# Patient Record
Sex: MALE | Race: WHITE | NOT HISPANIC OR LATINO | Employment: OTHER | URBAN - METROPOLITAN AREA
[De-identification: names, ages, dates, MRNs, and addresses within clinical notes are randomized per-mention and may not be internally consistent; named-entity substitution may affect disease eponyms.]

---

## 2017-05-04 ENCOUNTER — HOSPITAL ENCOUNTER (OUTPATIENT)
Dept: ORTHOPEDIC SURGERY | Facility: CLINIC | Age: 62
Discharge: HOME | End: 2017-05-04
Attending: ORTHOPAEDIC SURGERY | Admitting: ORTHOPAEDIC SURGERY

## 2018-06-23 ENCOUNTER — HOSPITAL ENCOUNTER (OUTPATIENT)
Dept: OTHER | Facility: HOSPITAL | Age: 63
Discharge: HOME | End: 2018-06-23
Attending: ORTHOPAEDIC SURGERY | Admitting: ORTHOPAEDIC SURGERY

## 2018-06-23 LAB
ANION GAP SERPL CALC-SCNC: 11.3 MMOL/L (ref 10–20)
APTT BLD: 22.7 SEC (ref 24–31)
BACTERIA SPEC AEROBE CULT: NORMAL
BASOPHILS # BLD AUTO: 0.1 10*3/UL (ref 0–0.2)
BASOPHILS NFR BLD AUTO: 1 % (ref 0–2)
BUN SERPL-MCNC: 14 MG/DL (ref 8–20)
BUN/CREAT SERPL: 15.6 (ref 6.2–20.3)
CALCIUM SERPL-MCNC: 9.8 MG/DL (ref 8.9–10.3)
CHLORIDE SERPL-SCNC: 108 MMOL/L (ref 101–111)
CONV CO2: 25 MMOL/L (ref 22–32)
CREAT UR-MCNC: 0.9 MG/DL (ref 0.7–1.2)
DIFFERENTIAL METHOD BLD: (no result)
EOSINOPHIL # BLD AUTO: 0.2 10*3/UL (ref 0–0.3)
EOSINOPHIL # BLD AUTO: 3 % (ref 0–3)
ERYTHROCYTE [DISTWIDTH] IN BLOOD BY AUTOMATED COUNT: 14 % (ref 11.5–14.5)
GLUCOSE SERPL-MCNC: 136 MG/DL (ref 65–99)
HCT VFR BLD AUTO: 43.5 % (ref 40–54)
HGB BLD-MCNC: 14.7 G/DL (ref 14–18)
INR PPP: 1
LYMPHOCYTES # BLD AUTO: 2 10*3/UL (ref 0.8–4.8)
LYMPHOCYTES NFR BLD AUTO: 31 % (ref 18–42)
Lab: NORMAL
MCH RBC QN AUTO: 31.9 PG (ref 26–32)
MCHC RBC AUTO-ENTMCNC: 33.9 G/DL (ref 32–36)
MCV RBC AUTO: 94.1 FL (ref 80–94)
MICRO REPORT STATUS: NORMAL
MONOCYTES # BLD AUTO: 0.8 10*3/UL (ref 0.1–1.3)
MONOCYTES NFR BLD AUTO: 12 % (ref 2–11)
NEUTROPHILS # BLD AUTO: 3.5 10*3/UL (ref 2.3–8.6)
NEUTROPHILS NFR BLD AUTO: 53 % (ref 50–75)
NRBC BLD AUTO-RTO: 0 /100{WBCS}
NRBC/RBC NFR BLD MANUAL: 0 10*3/UL
PLATELET # BLD AUTO: 184 10*3/UL (ref 150–450)
PMV BLD AUTO: 8.2 FL (ref 7.4–10.4)
POTASSIUM SERPL-SCNC: 4.3 MMOL/L (ref 3.6–5.1)
PROTHROMBIN TIME: 10.4 SEC (ref 9.6–11.7)
RBC # BLD AUTO: 4.62 10*6/UL (ref 4.6–6)
SODIUM SERPL-SCNC: 140 MMOL/L (ref 136–144)
SPECIMEN SOURCE: NORMAL
WBC # BLD AUTO: 6.6 10*3/UL (ref 4.5–11.5)

## 2018-06-26 ENCOUNTER — HOSPITAL ENCOUNTER (OUTPATIENT)
Dept: PREOP | Facility: HOSPITAL | Age: 63
Setting detail: HOSPITAL OUTPATIENT SURGERY
Discharge: HOME | End: 2018-06-26
Attending: ORTHOPAEDIC SURGERY | Admitting: ORTHOPAEDIC SURGERY

## 2018-06-26 LAB — GLUCOSE BLD-MCNC: 127 MG/DL (ref 70–105)

## 2021-03-22 ENCOUNTER — BULK ORDERING (OUTPATIENT)
Dept: CASE MANAGEMENT | Facility: OTHER | Age: 66
End: 2021-03-22

## 2021-03-22 DIAGNOSIS — Z23 IMMUNIZATION DUE: ICD-10-CM

## 2022-03-03 ENCOUNTER — ANESTHESIA EVENT (OUTPATIENT)
Dept: PERIOP | Facility: HOSPITAL | Age: 67
End: 2022-03-03

## 2022-03-03 ENCOUNTER — PRE-ADMISSION TESTING (OUTPATIENT)
Dept: PREADMISSION TESTING | Facility: HOSPITAL | Age: 67
End: 2022-03-03

## 2022-03-03 VITALS
SYSTOLIC BLOOD PRESSURE: 121 MMHG | RESPIRATION RATE: 20 BRPM | DIASTOLIC BLOOD PRESSURE: 67 MMHG | TEMPERATURE: 98.6 F | OXYGEN SATURATION: 96 % | WEIGHT: 302 LBS | HEIGHT: 71 IN | BODY MASS INDEX: 42.28 KG/M2 | HEART RATE: 96 BPM

## 2022-03-03 LAB
ANION GAP SERPL CALCULATED.3IONS-SCNC: 12.2 MMOL/L (ref 5–15)
BUN SERPL-MCNC: 16 MG/DL (ref 8–23)
BUN/CREAT SERPL: 13.1 (ref 7–25)
CALCIUM SPEC-SCNC: 8.9 MG/DL (ref 8.6–10.5)
CHLORIDE SERPL-SCNC: 106 MMOL/L (ref 98–107)
CO2 SERPL-SCNC: 22.8 MMOL/L (ref 22–29)
CREAT SERPL-MCNC: 1.22 MG/DL (ref 0.76–1.27)
DEPRECATED RDW RBC AUTO: 45.9 FL (ref 37–54)
EGFRCR SERPLBLD CKD-EPI 2021: 65 ML/MIN/1.73
ERYTHROCYTE [DISTWIDTH] IN BLOOD BY AUTOMATED COUNT: 12.8 % (ref 12.3–15.4)
GLUCOSE SERPL-MCNC: 191 MG/DL (ref 65–99)
HCT VFR BLD AUTO: 38.8 % (ref 37.5–51)
HGB BLD-MCNC: 12.4 G/DL (ref 13–17.7)
MCH RBC QN AUTO: 31.2 PG (ref 26.6–33)
MCHC RBC AUTO-ENTMCNC: 32 G/DL (ref 31.5–35.7)
MCV RBC AUTO: 97.7 FL (ref 79–97)
PLATELET # BLD AUTO: 205 10*3/MM3 (ref 140–450)
PMV BLD AUTO: 9.8 FL (ref 6–12)
POTASSIUM SERPL-SCNC: 4 MMOL/L (ref 3.5–5.2)
RBC # BLD AUTO: 3.97 10*6/MM3 (ref 4.14–5.8)
SARS-COV-2 ORF1AB RESP QL NAA+PROBE: NOT DETECTED
SODIUM SERPL-SCNC: 141 MMOL/L (ref 136–145)
WBC NRBC COR # BLD: 6.68 10*3/MM3 (ref 3.4–10.8)

## 2022-03-03 PROCEDURE — 80048 BASIC METABOLIC PNL TOTAL CA: CPT

## 2022-03-03 PROCEDURE — 36415 COLL VENOUS BLD VENIPUNCTURE: CPT

## 2022-03-03 PROCEDURE — C9803 HOPD COVID-19 SPEC COLLECT: HCPCS

## 2022-03-03 PROCEDURE — 85027 COMPLETE CBC AUTOMATED: CPT

## 2022-03-03 PROCEDURE — U0004 COV-19 TEST NON-CDC HGH THRU: HCPCS

## 2022-03-03 RX ORDER — GLIMEPIRIDE 2 MG/1
2 TABLET ORAL
COMMUNITY

## 2022-03-03 RX ORDER — FAMOTIDINE 20 MG
1 TABLET ORAL DAILY
COMMUNITY

## 2022-03-03 RX ORDER — DOXEPIN HYDROCHLORIDE 75 MG/1
75 CAPSULE ORAL NIGHTLY
COMMUNITY

## 2022-03-03 RX ORDER — METFORMIN HYDROCHLORIDE 750 MG/1
750 TABLET, EXTENDED RELEASE ORAL 2 TIMES DAILY
COMMUNITY

## 2022-03-03 RX ORDER — AMLODIPINE BESYLATE 5 MG/1
5 TABLET ORAL EVERY MORNING
COMMUNITY

## 2022-03-03 RX ORDER — LOSARTAN POTASSIUM 50 MG/1
50 TABLET ORAL NIGHTLY
COMMUNITY

## 2022-03-03 RX ORDER — BETAMETHASONE DIPROPIONATE 0.5 MG/G
1 CREAM TOPICAL 2 TIMES DAILY
Status: ON HOLD | COMMUNITY
End: 2022-03-04

## 2022-03-03 RX ORDER — OXYCODONE AND ACETAMINOPHEN 10; 325 MG/1; MG/1
1 TABLET ORAL EVERY 8 HOURS PRN
COMMUNITY

## 2022-03-03 RX ORDER — ACETAMINOPHEN 500 MG
1000 TABLET ORAL EVERY 6 HOURS PRN
COMMUNITY

## 2022-03-03 RX ORDER — FERROUS SULFATE 325(65) MG
325 TABLET ORAL
COMMUNITY

## 2022-03-03 RX ORDER — DULOXETIN HYDROCHLORIDE 60 MG/1
60 CAPSULE, DELAYED RELEASE ORAL NIGHTLY
COMMUNITY

## 2022-03-03 RX ORDER — TAMSULOSIN HYDROCHLORIDE 0.4 MG/1
1 CAPSULE ORAL EVERY EVENING
Status: ON HOLD | COMMUNITY
End: 2022-03-04

## 2022-03-03 RX ORDER — KETOCONAZOLE 20 MG/ML
1 SHAMPOO TOPICAL 2 TIMES WEEKLY
COMMUNITY

## 2022-03-03 RX ORDER — AMITRIPTYLINE HYDROCHLORIDE 50 MG/1
50 TABLET, FILM COATED ORAL NIGHTLY
COMMUNITY

## 2022-03-03 RX ORDER — PRAVASTATIN SODIUM 20 MG
20 TABLET ORAL NIGHTLY
COMMUNITY

## 2022-03-03 RX ORDER — GABAPENTIN 800 MG/1
800 TABLET ORAL 4 TIMES DAILY
COMMUNITY

## 2022-03-03 RX ORDER — PILOCARPINE HYDROCHLORIDE 5 MG/1
5 TABLET, FILM COATED ORAL 3 TIMES DAILY
COMMUNITY

## 2022-03-03 RX ORDER — LIDOCAINE 50 MG/G
1 PATCH TOPICAL EVERY 24 HOURS
COMMUNITY

## 2022-03-03 RX ORDER — DICLOFENAC SODIUM 20 MG/G
1 SOLUTION TOPICAL AS NEEDED
COMMUNITY

## 2022-03-04 ENCOUNTER — HOSPITAL ENCOUNTER (OUTPATIENT)
Facility: HOSPITAL | Age: 67
Setting detail: HOSPITAL OUTPATIENT SURGERY
Discharge: HOME | End: 2022-03-04
Attending: UROLOGY | Admitting: UROLOGY

## 2022-03-04 ENCOUNTER — ANESTHESIA (OUTPATIENT)
Dept: PERIOP | Facility: HOSPITAL | Age: 67
End: 2022-03-04

## 2022-03-04 VITALS
OXYGEN SATURATION: 93 % | TEMPERATURE: 97.3 F | SYSTOLIC BLOOD PRESSURE: 130 MMHG | HEIGHT: 71 IN | HEART RATE: 82 BPM | BODY MASS INDEX: 42.47 KG/M2 | DIASTOLIC BLOOD PRESSURE: 75 MMHG | RESPIRATION RATE: 20 BRPM | WEIGHT: 303.35 LBS

## 2022-03-04 DIAGNOSIS — N20.0 RENAL CALCULUS, RIGHT: Primary | ICD-10-CM

## 2022-03-04 LAB — GLUCOSE BLDC GLUCOMTR-MCNC: 121 MG/DL (ref 70–130)

## 2022-03-04 PROCEDURE — C1769 GUIDE WIRE: HCPCS | Performed by: UROLOGY

## 2022-03-04 PROCEDURE — 82962 GLUCOSE BLOOD TEST: CPT

## 2022-03-04 PROCEDURE — 25010000002 PHENYLEPHRINE 10 MG/ML SOLUTION: Performed by: NURSE ANESTHETIST, CERTIFIED REGISTERED

## 2022-03-04 PROCEDURE — 25010000002 PROPOFOL 10 MG/ML EMULSION: Performed by: NURSE ANESTHETIST, CERTIFIED REGISTERED

## 2022-03-04 PROCEDURE — 25010000002 ONDANSETRON PER 1 MG: Performed by: NURSE ANESTHETIST, CERTIFIED REGISTERED

## 2022-03-04 PROCEDURE — 25010000002 CEFAZOLIN IN DEXTROSE 2-4 GM/100ML-% SOLUTION: Performed by: UROLOGY

## 2022-03-04 PROCEDURE — 25010000002 SUCCINYLCHOLINE PER 20 MG: Performed by: NURSE ANESTHETIST, CERTIFIED REGISTERED

## 2022-03-04 RX ORDER — ONDANSETRON 2 MG/ML
4 INJECTION INTRAMUSCULAR; INTRAVENOUS ONCE AS NEEDED
Status: DISCONTINUED | OUTPATIENT
Start: 2022-03-04 | End: 2022-03-04 | Stop reason: HOSPADM

## 2022-03-04 RX ORDER — FENTANYL CITRATE 50 UG/ML
50 INJECTION, SOLUTION INTRAMUSCULAR; INTRAVENOUS
Status: DISCONTINUED | OUTPATIENT
Start: 2022-03-04 | End: 2022-03-04 | Stop reason: HOSPADM

## 2022-03-04 RX ORDER — HYDROCODONE BITARTRATE AND ACETAMINOPHEN 5; 325 MG/1; MG/1
1 TABLET ORAL ONCE AS NEEDED
Status: DISCONTINUED | OUTPATIENT
Start: 2022-03-04 | End: 2022-03-04 | Stop reason: HOSPADM

## 2022-03-04 RX ORDER — ONDANSETRON 2 MG/ML
INJECTION INTRAMUSCULAR; INTRAVENOUS AS NEEDED
Status: DISCONTINUED | OUTPATIENT
Start: 2022-03-04 | End: 2022-03-04 | Stop reason: SURG

## 2022-03-04 RX ORDER — SODIUM CHLORIDE 0.9 % (FLUSH) 0.9 %
3-10 SYRINGE (ML) INJECTION AS NEEDED
Status: DISCONTINUED | OUTPATIENT
Start: 2022-03-04 | End: 2022-03-04 | Stop reason: HOSPADM

## 2022-03-04 RX ORDER — CEFAZOLIN SODIUM 2 G/100ML
2 INJECTION, SOLUTION INTRAVENOUS ONCE
Status: COMPLETED | OUTPATIENT
Start: 2022-03-04 | End: 2022-03-04

## 2022-03-04 RX ORDER — LIDOCAINE HYDROCHLORIDE 20 MG/ML
INJECTION, SOLUTION INFILTRATION; PERINEURAL AS NEEDED
Status: DISCONTINUED | OUTPATIENT
Start: 2022-03-04 | End: 2022-03-04 | Stop reason: SURG

## 2022-03-04 RX ORDER — PHENYLEPHRINE HYDROCHLORIDE 10 MG/ML
INJECTION INTRAVENOUS AS NEEDED
Status: DISCONTINUED | OUTPATIENT
Start: 2022-03-04 | End: 2022-03-04 | Stop reason: SURG

## 2022-03-04 RX ORDER — GLYCOPYRROLATE 0.2 MG/ML
INJECTION INTRAMUSCULAR; INTRAVENOUS AS NEEDED
Status: DISCONTINUED | OUTPATIENT
Start: 2022-03-04 | End: 2022-03-04 | Stop reason: SURG

## 2022-03-04 RX ORDER — HYDROCODONE BITARTRATE AND ACETAMINOPHEN 7.5; 325 MG/1; MG/1
1 TABLET ORAL EVERY 6 HOURS PRN
Qty: 12 TABLET | Refills: 0 | Status: SHIPPED | OUTPATIENT
Start: 2022-03-04

## 2022-03-04 RX ORDER — ROCURONIUM BROMIDE 10 MG/ML
INJECTION, SOLUTION INTRAVENOUS AS NEEDED
Status: DISCONTINUED | OUTPATIENT
Start: 2022-03-04 | End: 2022-03-04 | Stop reason: SURG

## 2022-03-04 RX ORDER — FAMOTIDINE 10 MG/ML
20 INJECTION, SOLUTION INTRAVENOUS ONCE
Status: COMPLETED | OUTPATIENT
Start: 2022-03-04 | End: 2022-03-04

## 2022-03-04 RX ORDER — SODIUM CHLORIDE, SODIUM LACTATE, POTASSIUM CHLORIDE, CALCIUM CHLORIDE 600; 310; 30; 20 MG/100ML; MG/100ML; MG/100ML; MG/100ML
9 INJECTION, SOLUTION INTRAVENOUS CONTINUOUS
Status: DISCONTINUED | OUTPATIENT
Start: 2022-03-04 | End: 2022-03-04 | Stop reason: HOSPADM

## 2022-03-04 RX ORDER — MIDAZOLAM HYDROCHLORIDE 1 MG/ML
0.5 INJECTION INTRAMUSCULAR; INTRAVENOUS
Status: DISCONTINUED | OUTPATIENT
Start: 2022-03-04 | End: 2022-03-04 | Stop reason: HOSPADM

## 2022-03-04 RX ORDER — DROPERIDOL 2.5 MG/ML
0.62 INJECTION, SOLUTION INTRAMUSCULAR; INTRAVENOUS ONCE AS NEEDED
Status: DISCONTINUED | OUTPATIENT
Start: 2022-03-04 | End: 2022-03-04 | Stop reason: HOSPADM

## 2022-03-04 RX ORDER — DIPHENHYDRAMINE HYDROCHLORIDE 50 MG/ML
12.5 INJECTION INTRAMUSCULAR; INTRAVENOUS
Status: DISCONTINUED | OUTPATIENT
Start: 2022-03-04 | End: 2022-03-04 | Stop reason: HOSPADM

## 2022-03-04 RX ORDER — EPHEDRINE SULFATE 50 MG/ML
INJECTION, SOLUTION INTRAVENOUS AS NEEDED
Status: DISCONTINUED | OUTPATIENT
Start: 2022-03-04 | End: 2022-03-04 | Stop reason: SURG

## 2022-03-04 RX ORDER — SUCCINYLCHOLINE CHLORIDE 20 MG/ML
INJECTION INTRAMUSCULAR; INTRAVENOUS AS NEEDED
Status: DISCONTINUED | OUTPATIENT
Start: 2022-03-04 | End: 2022-03-04 | Stop reason: SURG

## 2022-03-04 RX ORDER — ENALAPRILAT 2.5 MG/2ML
1.25 INJECTION INTRAVENOUS ONCE AS NEEDED
Status: DISCONTINUED | OUTPATIENT
Start: 2022-03-04 | End: 2022-03-04 | Stop reason: HOSPADM

## 2022-03-04 RX ORDER — OXYCODONE AND ACETAMINOPHEN 7.5; 325 MG/1; MG/1
1 TABLET ORAL ONCE AS NEEDED
Status: DISCONTINUED | OUTPATIENT
Start: 2022-03-04 | End: 2022-03-04 | Stop reason: HOSPADM

## 2022-03-04 RX ORDER — LIDOCAINE HYDROCHLORIDE 10 MG/ML
0.5 INJECTION, SOLUTION EPIDURAL; INFILTRATION; INTRACAUDAL; PERINEURAL ONCE AS NEEDED
Status: DISCONTINUED | OUTPATIENT
Start: 2022-03-04 | End: 2022-03-04 | Stop reason: HOSPADM

## 2022-03-04 RX ORDER — SODIUM CHLORIDE 9 MG/ML
INJECTION, SOLUTION INTRAVENOUS AS NEEDED
Status: DISCONTINUED | OUTPATIENT
Start: 2022-03-04 | End: 2022-03-04 | Stop reason: HOSPADM

## 2022-03-04 RX ORDER — SODIUM CHLORIDE 0.9 % (FLUSH) 0.9 %
3 SYRINGE (ML) INJECTION EVERY 12 HOURS SCHEDULED
Status: DISCONTINUED | OUTPATIENT
Start: 2022-03-04 | End: 2022-03-04 | Stop reason: HOSPADM

## 2022-03-04 RX ORDER — LABETALOL HYDROCHLORIDE 5 MG/ML
5 INJECTION, SOLUTION INTRAVENOUS
Status: DISCONTINUED | OUTPATIENT
Start: 2022-03-04 | End: 2022-03-04 | Stop reason: HOSPADM

## 2022-03-04 RX ORDER — HYDROMORPHONE HYDROCHLORIDE 1 MG/ML
0.25 INJECTION, SOLUTION INTRAMUSCULAR; INTRAVENOUS; SUBCUTANEOUS
Status: DISCONTINUED | OUTPATIENT
Start: 2022-03-04 | End: 2022-03-04 | Stop reason: HOSPADM

## 2022-03-04 RX ORDER — VASOPRESSIN 20 U/ML
INJECTION PARENTERAL AS NEEDED
Status: DISCONTINUED | OUTPATIENT
Start: 2022-03-04 | End: 2022-03-04 | Stop reason: SURG

## 2022-03-04 RX ORDER — SODIUM CHLORIDE, SODIUM LACTATE, POTASSIUM CHLORIDE, CALCIUM CHLORIDE 600; 310; 30; 20 MG/100ML; MG/100ML; MG/100ML; MG/100ML
INJECTION, SOLUTION INTRAVENOUS CONTINUOUS PRN
Status: DISCONTINUED | OUTPATIENT
Start: 2022-03-04 | End: 2022-03-04 | Stop reason: SURG

## 2022-03-04 RX ORDER — PROPOFOL 10 MG/ML
VIAL (ML) INTRAVENOUS AS NEEDED
Status: DISCONTINUED | OUTPATIENT
Start: 2022-03-04 | End: 2022-03-04 | Stop reason: SURG

## 2022-03-04 RX ORDER — TAMSULOSIN HYDROCHLORIDE 0.4 MG/1
1 CAPSULE ORAL DAILY
COMMUNITY

## 2022-03-04 RX ORDER — NALOXONE HCL 0.4 MG/ML
0.4 VIAL (ML) INJECTION AS NEEDED
Status: DISCONTINUED | OUTPATIENT
Start: 2022-03-04 | End: 2022-03-04 | Stop reason: HOSPADM

## 2022-03-04 RX ORDER — SULFAMETHOXAZOLE AND TRIMETHOPRIM 800; 160 MG/1; MG/1
1 TABLET ORAL 2 TIMES DAILY
Qty: 6 TABLET | Refills: 0 | Status: SHIPPED | OUTPATIENT
Start: 2022-03-04

## 2022-03-04 RX ADMIN — PHENYLEPHRINE HYDROCHLORIDE 300 MCG: 10 INJECTION, SOLUTION INTRAVENOUS at 10:29

## 2022-03-04 RX ADMIN — SUCCINYLCHOLINE CHLORIDE 140 MG: 20 INJECTION, SOLUTION INTRAMUSCULAR; INTRAVENOUS; PARENTERAL at 10:20

## 2022-03-04 RX ADMIN — FAMOTIDINE 20 MG: 10 INJECTION INTRAVENOUS at 09:43

## 2022-03-04 RX ADMIN — SODIUM CHLORIDE, POTASSIUM CHLORIDE, SODIUM LACTATE AND CALCIUM CHLORIDE 9 ML/HR: 600; 310; 30; 20 INJECTION, SOLUTION INTRAVENOUS at 09:17

## 2022-03-04 RX ADMIN — CEFAZOLIN SODIUM 2 G: 2 INJECTION, SOLUTION INTRAVENOUS at 10:30

## 2022-03-04 RX ADMIN — CEFAZOLIN SODIUM 2 G: 2 INJECTION, SOLUTION INTRAVENOUS at 10:12

## 2022-03-04 RX ADMIN — EPHEDRINE SULFATE 10 MG: 50 INJECTION INTRAVENOUS at 10:35

## 2022-03-04 RX ADMIN — GLYCOPYRROLATE 0.2 MG: 0.2 INJECTION INTRAMUSCULAR; INTRAVENOUS at 10:20

## 2022-03-04 RX ADMIN — SODIUM CHLORIDE, POTASSIUM CHLORIDE, SODIUM LACTATE AND CALCIUM CHLORIDE: 600; 310; 30; 20 INJECTION, SOLUTION INTRAVENOUS at 10:15

## 2022-03-04 RX ADMIN — VASOPRESSIN 2 UNITS: 20 INJECTION INTRAVENOUS at 10:41

## 2022-03-04 RX ADMIN — LIDOCAINE HYDROCHLORIDE 60 MG: 20 INJECTION, SOLUTION INFILTRATION; PERINEURAL at 10:20

## 2022-03-04 RX ADMIN — PHENYLEPHRINE HYDROCHLORIDE 100 MCG: 10 INJECTION, SOLUTION INTRAVENOUS at 10:27

## 2022-03-04 RX ADMIN — ONDANSETRON 4 MG: 2 INJECTION INTRAMUSCULAR; INTRAVENOUS at 10:32

## 2022-03-04 RX ADMIN — PROPOFOL 300 MG: 10 INJECTION, EMULSION INTRAVENOUS at 10:20

## 2022-03-04 RX ADMIN — PHENYLEPHRINE HYDROCHLORIDE 200 MCG: 10 INJECTION, SOLUTION INTRAVENOUS at 10:32

## 2022-03-04 RX ADMIN — ROCURONIUM BROMIDE 5 MG: 50 INJECTION INTRAVENOUS at 10:20

## 2022-03-04 NOTE — ANESTHESIA PREPROCEDURE EVALUATION
Anesthesia Evaluation     Patient summary reviewed and Nursing notes reviewed   NPO Solid Status: > 8 hours  NPO Liquid Status: > 2 hours           Airway   Mallampati: II  TM distance: >3 FB  Neck ROM: full  Possible difficult intubation  Comment: Blade: Medina 3; Grade View: 1    This was before his cervical fusion  Dental - normal exam     Pulmonary - normal exam    breath sounds clear to auscultation  (+) a smoker Former, COPD, sleep apnea (At risk for sleep apnea),   Cardiovascular - normal exam    ECG reviewed  Rhythm: regular  Rate: normal    (+) hypertension 2 medications or greater, hyperlipidemia,   (-) angina, orthopnea, PND, DRAPER    ROS comment: Sinus tachycardia    Neuro/Psych  (+) seizures well controlled,    GI/Hepatic/Renal/Endo    (+) morbid obesity,  renal disease stones, diabetes mellitus type 2,     ROS Comment: LAPAROSCOPIC GASTRIC BANDING    Musculoskeletal     (+) back pain,   Abdominal    Substance History - negative use     OB/GYN negative ob/gyn ROS         Other   arthritis,                      Anesthesia Plan    ASA 3     general     intravenous induction     Anesthetic plan, all risks, benefits, and alternatives have been provided, discussed and informed consent has been obtained with: patient.        CODE STATUS:

## 2022-03-04 NOTE — H&P
[facility]      Patient Identification:  NAME:  Ricardo Hinds  Age:  66 y.o.   Sex:  male   :  1955   MRN:  [id number]       Chief complaint: Right renal calculus  History of present illness: 66-year-old patient with a complicated bilateral stone issue.  He had a very large obstructing stone in the mid to proximal ureter on the left side which required placement of a cyst a double-J stent after ureteroscopic treatment and stent was also placed on the right side at that time to protect his right unit because of a 12 mm stone in the right renal pelvis today we are scheduled to treat his right-sided stone the plan is to remove the left-sided stent to replace the right-sided stent but leave an intact string treat the stone and have the patient remove his stent 3 to 5 days postoperatively.      Past medical history:  Past Medical History:   Diagnosis Date   • Anemia    • Arthritis    • At risk for sleep apnea     6   • Back pain    • BPH (benign prostatic hyperplasia)    • COPD (chronic obstructive pulmonary disease) (HCC)    • Diabetes mellitus (HCC)    • Dry mouth    • Hematuria    • Hyperlipidemia    • Hypertension    • Kidney stone    • Seizures (HCC)     history when hit head long time ago       Past surgical history:  Past Surgical History:   Procedure Laterality Date   • ANTERIOR CERVICAL FUSION      hardware   • COLONOSCOPY     • ENDOSCOPY     • GALLBLADDER SURGERY     • KIDNEY STONE SURGERY      multiple   • LAPAROSCOPIC GASTRIC BANDING     • LUMBAR LAMINECTOMY WITH FUSION     • ORIF CLAVICLE FRACTURE     • UMBILICAL HERNIA REPAIR         Allergies:  Patient has no known allergies.    Home medications:  Medications Prior to Admission   Medication Sig Dispense Refill Last Dose   • acetaminophen (TYLENOL) 500 MG tablet Take 1,000 mg by mouth Every 6 (Six) Hours As Needed for Mild Pain .   3/3/2022 at 0630   • amitriptyline (ELAVIL) 50 MG tablet Take 50 mg by mouth Every Night.    3/3/2022 at Unknown time   • amLODIPine (NORVASC) 5 MG tablet Take 5 mg by mouth Every Morning.   3/3/2022 at 0800   • metFORMIN ER (GLUCOPHAGE-XR) 750 MG 24 hr tablet Take 750 mg by mouth 2 (Two) Times a Day.   3/4/2022 at Unknown time   • ASPIRIN 81 PO Take 81 mg by mouth Daily.      • Calcium Citrate-Vitamin D (CALCIUM + D PO) Take 1 tablet by mouth Daily.      • Diclofenac Sodium 2 % solution Apply 1 application topically As Needed.      • doxepin (SINEquan) 75 MG capsule Take 75 mg by mouth Every Night.      • DULoxetine (CYMBALTA) 60 MG capsule Take 60 mg by mouth Every Night.      • ferrous sulfate 325 (65 FE) MG tablet Take 325 mg by mouth Daily With Breakfast.      • Fluticasone-Umeclidin-Vilant (Trelegy Ellipta) 100-62.5-25 MCG/INH inhaler Inhale 1 puff Daily.      • gabapentin (NEURONTIN) 800 MG tablet Take 800 mg by mouth 4 (Four) Times a Day.      • glimepiride (AMARYL) 2 MG tablet Take 2 mg by mouth Every Morning Before Breakfast.      • ketoconazole (NIZORAL) 2 % shampoo Apply 1 application topically to the appropriate area as directed 2 (Two) Times a Week.      • lidocaine (LIDODERM) 5 % Place 1 patch on the skin as directed by provider Daily. Remove & Discard patch within 12 hours or as directed by MD      • losartan (COZAAR) 50 MG tablet Take 50 mg by mouth Every Night.      • oxyCODONE-acetaminophen (PERCOCET)  MG per tablet Take 1 tablet by mouth Every 8 (Eight) Hours As Needed for Moderate Pain .      • pilocarpine (SALAGEN) 5 MG tablet Take 5 mg by mouth 3 (Three) Times a Day.      • pravastatin (PRAVACHOL) 20 MG tablet Take 20 mg by mouth Every Night.      • SITagliptin (JANUVIA) 100 MG tablet Take 100 mg by mouth Every Morning.      • tamsulosin (FLOMAX) 0.4 MG capsule 24 hr capsule Take 1 capsule by mouth Every Evening.      • Vitamin D, Cholecalciferol, 25 MCG (1000 UT) capsule Take 1 capsule by mouth Daily.      • Zinc 50 MG capsule Take 1 tablet by mouth Daily.           Hospital  medications:  ceFAZolin, 2 g, Intravenous, Once  famotidine, 20 mg, Intravenous, Once  sodium chloride, 3 mL, Intravenous, Q12H      lactated ringers, 9 mL/hr, Last Rate: 9 mL/hr (22)      fentanyl  •  lidocaine PF 1%  •  midazolam  •  sodium chloride    Family history:  Family History   Problem Relation Age of Onset   • Malig Hyperthermia Neg Hx        Social history:  Social History     Tobacco Use   • Smoking status: Former Smoker     Years: 10.00     Types: Cigars     Quit date:      Years since quittin.1   • Smokeless tobacco: Never Used   • Tobacco comment: 1-2 daily   Vaping Use   • Vaping Use: Never used   Substance Use Topics   • Alcohol use: Not Currently   • Drug use: Never       Review of systems:      Positive for: Recent ureteroscopy with laser lithotripsy negative for:      Objective:  TMax 24 hours:   Temp (24hrs), Av.8 °F (36.6 °C), Min:97.8 °F (36.6 °C), Max:97.8 °F (36.6 °C)      Vitals Ranges:   Temp:  [97.8 °F (36.6 °C)] 97.8 °F (36.6 °C)  Heart Rate:  [90] 90  Resp:  [19] 19  BP: (168)/(89) 168/89    Intake/Output Last 3 shifts:  No intake/output data recorded.     Physical Exam:    General Appearance:    Alert, cooperative, NAD   HEENT:    No trauma, pupils reactive, hearing intact   Back:     No CVA tenderness   Lungs:     Respirations unlabored, no wheezing    Heart:    RRR, intact peripheral pulses   Abdomen:     Soft, NDNT, no masses, no guarding   :    Testes descended bilaterally, no nodules.  Penis normal.  No scrotal or penile rashes noted   Extremities:   No edema, no deformity   Lymphatic:   No neck or groin LAD   Skin:   No bleeding, bruising or rashes   Neuro/Psych:   Orientation intact, mood/affect pleasant, no focal findings       Results review:   I reviewed the patient's new clinical results.    Data review:  Lab Results (last 24 hours)     Procedure Component Value Units Date/Time    POC Glucose Once [[id number]]  (Normal) Collected: 22     Specimen: Blood Updated: 03/04/22 0917     Glucose 121 mg/dL      Comment: Meter: [id number] : [id number] Minerva LAMAS RN              Imaging:  Imaging Results (Last 24 Hours)     ** No results found for the last 24 hours. **             Assessment:       * No active hospital problems. *    Right renal calculus    Plan:     Right extracorporeal shockwave lithotripsy  Right stent exchange  Left stent removal    Julio Gao MD  03/04/22  09:27 EST

## 2022-03-04 NOTE — OP NOTE
Operative Report     [facility]    Patient: Ricardo Hinds  Age:      66 y.o.  :     1955  Sex:      male    Medical Record:  [id number]    Date of Operation/Procedure:  3/4/2022    Pre-op Diagnosis:   Right renal calculus    Post-Op Diagnosis Codes:  Same    Pre-operative Diagnosis Free Text:  * No pre-op diagnosis entered *     Name of Operation/Procedure:  Procedure(s) and Anesthesia Type:     * EXTRACORPOREAL SHOCKWAVE LITHOTRIPSY, CYSTOSCOPY, LEFT STENT REMOVAL -  as part of a planned staged procedure    Findings/Complications: 2 stones were found in the right kidney right next to each othe     Procedure the patient was brought to the [facility] and underwent a general anesthesia.  He had had a previous ureteroscopy with extraction of a large proximal ureteral stone on the left side and a double-J stent was present on the left side as well as the right we imaged the left side and did not see any remaining stones and therefore the patient was prepped and draped in a sterile fashion a flexible cystoscopic examination was carried out.    The patient has a normal anterior urethra a nonobstructing prostate and negative bladder with 2 double-J stents noted using a grasping forcep we remove the left-sided stent.  Because fluoroscopically imaging the right kidney identified 2 stones in the kidney we felt it was best to leave the right-sided stent in place until we could document that both stones were effectively treated and the patient would not need an additional therapy.    Therefore we elected to leave the double-J stent in on the right and after appropriate imaging and proper positioning of the patient he was treated with shockwave lithotripsy to the 2 stones in the right kidney which were both within the blast path of our treatment.  He received a total of 3000 shocks maximum power setting of 24 KV and at completion of our treatment was taken from the operating room in satisfactory condition having  tolerated the procedure without complication he will [facility] week with a follow-up KUB and decide about appropriate follow-up treatment  Estimated Blood Loss: none    Specimens: * No orders in the log *    Fluids/Drains: Right-sided indwelling stent was left in place    Julio Gao MD  3/4/2022  11:01 EST

## 2022-03-04 NOTE — ANESTHESIA PROCEDURE NOTES
Airway  Urgency: elective    Date/Time: 3/4/2022 10:23 AM  Airway not difficult    General Information and Staff    Patient location during procedure: OR  CRNA: Berna John CRNA    Indications and Patient Condition  Indications for airway management: airway protection    Preoxygenated: yes  MILS maintained throughout  Mask difficulty assessment: 2 - vent by mask + OA or adjuvant +/- NMBA    Final Airway Details  Final airway type: endotracheal airway      Successful airway: ETT  Cuffed: yes   Successful intubation technique: direct laryngoscopy  Facilitating devices/methods: anterior pressure/BURP  Endotracheal tube insertion site: oral  Blade: Medina  Blade size: 2  ETT size (mm): 7.5  Cormack-Lehane Classification: grade I - full view of glottis  Placement verified by: chest auscultation   Cuff volume (mL): 8  Measured from: lips  Number of attempts at approach: 1    Additional Comments  Pt preoxygenated, SIVI, bag mask vent, ATETI, dentition as before

## 2022-03-04 NOTE — ANESTHESIA POSTPROCEDURE EVALUATION
"Patient: Ricardo Hinds    Procedure Summary     Date: 03/04/22 Room / Location:  [facility]  /  [facility]    Anesthesia Start: 1015 Anesthesia Stop: 1106    Procedure: EXTRACORPOREAL SHOCKWAVE LITHOTRIPSY, CYSTOSCOPY, LEFT STENT REMOVAL (Right ) Diagnosis:     Surgeons: Julio Gao MD Provider: Trent Hurtado MD    Anesthesia Type: general ASA Status: 3          Anesthesia Type: general    Vitals  Vitals Value Taken Time   /70 03/04/22 1131   Temp 36.3 °C (97.3 °F) 03/04/22 1103   Pulse 77 03/04/22 1132   Resp 20 03/04/22 1130   SpO2 96 % 03/04/22 1132   Vitals shown include unvalidated device data.        Post Anesthesia Care and Evaluation    Patient location during evaluation: PHASE II  Patient participation: complete - patient participated  Level of consciousness: awake  Pain management: adequate  Airway patency: patent  Anesthetic complications: No anesthetic complications    Cardiovascular status: acceptable  Respiratory status: acceptable  Hydration status: acceptable    Comments: /70   Pulse 82   Temp 36.3 °C (97.3 °F) (Temporal)   Resp 20   Ht 180.3 cm (70.98\")   Wt (!) 138 kg (303 lb 5.7 oz)   SpO2 93%   BMI 42.33 kg/m²         "

## (undated) DEVICE — GLV SURG SIGNATURE ESSENTIAL PF LTX SZ8.5

## (undated) DEVICE — LOU CYSTO: Brand: MEDLINE INDUSTRIES, INC.

## (undated) DEVICE — NITINOL WIRE WITH HYDROPHILIC TIP: Brand: SENSOR

## (undated) DEVICE — PREP SOL DYNA-HEX CHG LIQ 4% BT 4OZ